# Patient Record
Sex: FEMALE | Race: WHITE | Employment: PART TIME | URBAN - METROPOLITAN AREA
[De-identification: names, ages, dates, MRNs, and addresses within clinical notes are randomized per-mention and may not be internally consistent; named-entity substitution may affect disease eponyms.]

---

## 2017-06-21 LAB — PAP SMEAR - HIM PATIENT REPORTED: NEGATIVE

## 2017-07-11 ENCOUNTER — OFFICE VISIT (OUTPATIENT)
Dept: FAMILY MEDICINE | Facility: CLINIC | Age: 32
End: 2017-07-11
Payer: COMMERCIAL

## 2017-07-11 VITALS
DIASTOLIC BLOOD PRESSURE: 70 MMHG | RESPIRATION RATE: 20 BRPM | OXYGEN SATURATION: 97 % | WEIGHT: 117.2 LBS | HEIGHT: 66 IN | SYSTOLIC BLOOD PRESSURE: 104 MMHG | HEART RATE: 73 BPM | BODY MASS INDEX: 18.84 KG/M2 | TEMPERATURE: 98 F

## 2017-07-11 DIAGNOSIS — Z30.09 BIRTH CONTROL COUNSELING: ICD-10-CM

## 2017-07-11 DIAGNOSIS — Z09 HOSPITAL DISCHARGE FOLLOW-UP: Primary | ICD-10-CM

## 2017-07-11 DIAGNOSIS — K35.30 ACUTE APPENDICITIS WITH LOCALIZED PERITONITIS: ICD-10-CM

## 2017-07-11 DIAGNOSIS — Z97.5: ICD-10-CM

## 2017-07-11 DIAGNOSIS — T83.9XXD: ICD-10-CM

## 2017-07-11 DIAGNOSIS — Z80.3 FAMILY HISTORY OF MALIGNANT NEOPLASM OF BREAST: ICD-10-CM

## 2017-07-11 PROBLEM — M54.50 LOW BACK PAIN: Status: ACTIVE | Noted: 2017-01-23

## 2017-07-11 PROBLEM — T83.9XXA: Status: ACTIVE | Noted: 2017-07-05

## 2017-07-11 PROBLEM — Z87.59 PERSONAL HISTORY OF OTHER COMPLICATIONS OF PREGNANCY, CHILDBIRTH AND THE PUERPERIUM: Status: ACTIVE | Noted: 2017-05-05

## 2017-07-11 PROCEDURE — 99204 OFFICE O/P NEW MOD 45 MIN: CPT | Performed by: PHYSICIAN ASSISTANT

## 2017-07-11 RX ORDER — IBUPROFEN 800 MG/1
800 TABLET, FILM COATED ORAL
COMMUNITY
Start: 2017-07-06 | End: 2017-08-18

## 2017-07-11 NOTE — Clinical Note
Mammogram: 9/2/15 negative, [facility], repeat yearly Pap: 6/21/17 negative, [facility], negative HPV
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
## 2017-07-11 NOTE — MR AVS SNAPSHOT
"              After Visit Summary   2017    Teena Fagan    MRN: [id number]           Patient Information     Date Of Birth          1985        Visit Information        Provider Department      2017 1:30 PM Anna Esteves PA-C [facility]        Today's [facility]     Hospital discharge follow-up    -  1       Follow-ups after your visit        Who to contact     If you have questions or need follow up information about today's clinic visit or your schedule please contact [facility] directly at [phone].  Normal or non-critical lab and imaging results will be communicated to you by [employer], letter or phone within 4 business days after the clinic has received the results. If you do not hear from us within 7 days, please contact the clinic through [employer] or phone. If you have a critical or abnormal lab result, we will notify you by phone as soon as possible.  Submit refill requests through [employer] or call your pharmacy and they will forward the refill request to us. Please allow 3 business days for your refill to be completed.          Additional Information About Your Visit        MyChart Information     [employer] lets you send messages to your doctor, view your test results, renew your prescriptions, schedule appointments and more. To sign up, go to [email or web address] . Click on \"Log in\" on the left side of the screen, which will take you to the Welcome page. Then click on \"Sign up Now\" on the right side of the page.     You will be asked to enter the access code listed below, as well as some personal information. Please follow the directions to create your username and password.     Your access code is: [email or web address]  Expires: 10/9/2017  2:03 PM     Your access code will  in 90 days. If you need help or a new code, please call your [facility] or [phone].        Care EveryWhere ID     This is your Care EveryWhere ID. This could be " "used by other organizations to access your [place] medical records  [id number]        Your Vitals Were     Pulse Temperature Respirations Height Pulse Oximetry Breastfeeding?    73 98  F (36.7  C) (Oral) 20 5' 5.5\" (1.664 m) 97% Yes    BMI (Body Mass Index)                   19.21 kg/m2            Blood Pressure from Last 3 Encounters:   07/11/17 104/70   12/17/14 105/77    Weight from Last 3 Encounters:   07/11/17 117 lb 3.2 oz (53.2 kg)              Today, you had the following     No orders found for display       Primary Care Provider    None Specified       No primary provider on file.        Equal Access to Services     [facility]: Hadii tahmina Palumbo, lilia larsen, chelsi mcknightalmaissa sapp, naldo eckert . So [facility] [phone].    ATENCIÓN: Si habla español, tiene a carbajal disposición servicios gratuitos de asistencia lingüística. Llame al [phone].    We comply with applicable federal civil rights laws and [place] laws. We do not discriminate on the basis of race, color, national origin, age, disability sex, sexual orientation or gender identity.            Thank you!     Thank you for choosing [facility]  for your care. Our goal is always to provide you with excellent care. Hearing back from our patients is one way we can continue to improve our services. Please take a few minutes to complete the written survey that you may receive in the mail after your visit with us. Thank you!             Your Updated Medication List - Protect others around you: Learn how to safely use, store and throw away your medicines at www.disposemymeds.org.      Notice  As of 7/11/2017  2:03 PM    You have not been prescribed any medications.      "

## 2017-08-18 ENCOUNTER — OFFICE VISIT (OUTPATIENT)
Dept: FAMILY MEDICINE | Facility: CLINIC | Age: 32
End: 2017-08-18
Payer: COMMERCIAL

## 2017-08-18 VITALS
WEIGHT: 117 LBS | SYSTOLIC BLOOD PRESSURE: 100 MMHG | HEART RATE: 87 BPM | OXYGEN SATURATION: 98 % | TEMPERATURE: 97.2 F | BODY MASS INDEX: 19.17 KG/M2 | DIASTOLIC BLOOD PRESSURE: 60 MMHG

## 2017-08-18 DIAGNOSIS — R00.2 PALPITATIONS: Primary | ICD-10-CM

## 2017-08-18 DIAGNOSIS — D17.1 BENIGN LIPOMATOUS NEOPLASM OF SKIN AND SUBCUTANEOUS TISSUE OF TRUNK: ICD-10-CM

## 2017-08-18 DIAGNOSIS — K64.4 EXTERNAL HEMORRHOIDS: ICD-10-CM

## 2017-08-18 LAB
ERYTHROCYTE [DISTWIDTH] IN BLOOD BY AUTOMATED COUNT: 11.4 % (ref 10–15)
HCT VFR BLD AUTO: 37.7 % (ref 35–47)
HGB BLD-MCNC: 12.4 G/DL (ref 11.7–15.7)
MCH RBC QN AUTO: 29.7 PG (ref 26.5–33)
MCHC RBC AUTO-ENTMCNC: 32.9 G/DL (ref 31.5–36.5)
MCV RBC AUTO: 90 FL (ref 78–100)
PLATELET # BLD AUTO: 279 10E9/L (ref 150–450)
RBC # BLD AUTO: 4.17 10E12/L (ref 3.8–5.2)
WBC # BLD AUTO: 6.6 10E9/L (ref 4–11)

## 2017-08-18 PROCEDURE — 85027 COMPLETE CBC AUTOMATED: CPT | Performed by: PHYSICIAN ASSISTANT

## 2017-08-18 PROCEDURE — 36415 COLL VENOUS BLD VENIPUNCTURE: CPT | Performed by: PHYSICIAN ASSISTANT

## 2017-08-18 PROCEDURE — 93000 ELECTROCARDIOGRAM COMPLETE: CPT | Performed by: PHYSICIAN ASSISTANT

## 2017-08-18 PROCEDURE — 84443 ASSAY THYROID STIM HORMONE: CPT | Performed by: PHYSICIAN ASSISTANT

## 2017-08-18 PROCEDURE — 99214 OFFICE O/P EST MOD 30 MIN: CPT | Performed by: PHYSICIAN ASSISTANT
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
## 2017-08-18 NOTE — NURSING NOTE
"Chief Complaint   Patient presents with     Rectal Problem       Initial /60 (BP Location: Right arm, Patient Position: Chair, Cuff Size: Adult Regular)  Pulse 87  Temp 97.2  F (36.2  C) (Tympanic)  Wt 117 lb (53.1 kg)  SpO2 98%  BMI 19.17 kg/m2 Estimated body mass index is 19.17 kg/(m^2) as calculated from the following:    Height as of 7/11/17: 5' 5.5\" (1.664 m).    Weight as of this encounter: 117 lb (53.1 kg).  Medication Reconciliation: complete.Tan BALDERAS MA      "

## 2017-08-19 LAB — TSH SERPL DL<=0.005 MIU/L-ACNC: 2.17 MU/L (ref 0.4–4)

## 2017-08-22 ENCOUNTER — HOSPITAL ENCOUNTER (OUTPATIENT)
Dept: CARDIOLOGY | Facility: CLINIC | Age: 32
Discharge: HOME | End: 2017-08-22
Attending: PHYSICIAN ASSISTANT | Admitting: PHYSICIAN ASSISTANT
Payer: COMMERCIAL

## 2017-08-22 DIAGNOSIS — R00.2 PALPITATIONS: ICD-10-CM

## 2017-08-22 PROCEDURE — 93272 ECG/REVIEW INTERPRET ONLY: CPT | Performed by: INTERNAL MEDICINE

## 2017-08-22 PROCEDURE — 93270 REMOTE 30 DAY ECG REV/REPORT: CPT

## 2017-10-03 ENCOUNTER — TRANSFERRED RECORDS (OUTPATIENT)
Dept: HEALTH INFORMATION MANAGEMENT | Facility: CLINIC | Age: 32
End: 2017-10-03

## 2017-10-13 ENCOUNTER — TRANSFERRED RECORDS (OUTPATIENT)
Dept: HEALTH INFORMATION MANAGEMENT | Facility: CLINIC | Age: 32
End: 2017-10-13

## 2017-10-17 ENCOUNTER — TRANSFERRED RECORDS (OUTPATIENT)
Dept: HEALTH INFORMATION MANAGEMENT | Facility: CLINIC | Age: 32
End: 2017-10-17

## 2017-12-01 ENCOUNTER — OFFICE VISIT (OUTPATIENT)
Dept: FAMILY MEDICINE | Facility: CLINIC | Age: 32
End: 2017-12-01
Payer: COMMERCIAL

## 2017-12-01 VITALS
WEIGHT: 115.4 LBS | DIASTOLIC BLOOD PRESSURE: 62 MMHG | BODY MASS INDEX: 18.54 KG/M2 | SYSTOLIC BLOOD PRESSURE: 108 MMHG | RESPIRATION RATE: 18 BRPM | HEART RATE: 91 BPM | HEIGHT: 66 IN | OXYGEN SATURATION: 94 % | TEMPERATURE: 97.8 F

## 2017-12-01 DIAGNOSIS — I47.10 PAROXYSMAL SUPRAVENTRICULAR TACHYCARDIA (H): Primary | ICD-10-CM

## 2017-12-01 PROBLEM — Z12.4 CERVICAL CANCER SCREENING: Status: ACTIVE | Noted: 2017-06-27

## 2017-12-01 PROCEDURE — 99213 OFFICE O/P EST LOW 20 MIN: CPT | Performed by: PHYSICIAN ASSISTANT

## 2017-12-01 RX ORDER — PETROLATUM,WHITE
OINTMENT IN PACKET (GRAM) TOPICAL
COMMUNITY
Start: 2017-10-20

## 2017-12-01 NOTE — NURSING NOTE
"Chief Complaint   Patient presents with     Pre-Op Exam       Initial /62 (BP Location: Right arm, Patient Position: Chair, Cuff Size: Adult Regular)  Pulse 91  Temp 97.8  F (36.6  C) (Oral)  Resp 18  Ht 5' 5.5\" (1.664 m)  Wt 115 lb 6.4 oz (52.3 kg)  LMP  (LMP Unknown)  SpO2 94%  Breastfeeding? No  BMI 18.91 kg/m2 Estimated body mass index is 18.91 kg/(m^2) as calculated from the following:    Height as of this encounter: 5' 5.5\" (1.664 m).    Weight as of this encounter: 115 lb 6.4 oz (52.3 kg).  Medication Reconciliation: complete   Madi Avalos CMA (AAMA)    "

## 2017-12-01 NOTE — MR AVS SNAPSHOT
After Visit Summary   12/1/2017    Teena Fagan    MRN: [id number]           Patient Information     Date Of Birth          1985        Visit Information        Provider Department      12/1/2017 10:30 AM Anna Esteves PA-C [facility]        Today's Diagnoses     Paroxysmal supraventricular tachycardia (H)    -  1      Care Instructions      Before Your Surgery      Call your surgeon if there is any change in your health. This includes signs of a cold or flu (such as a sore throat, runny nose, cough, rash or fever).    Do not smoke, drink alcohol or take over the counter medicine (unless your surgeon or primary care doctor tells you to) for the 24 hours before and after surgery.    If you take prescribed drugs: Follow your doctor s orders about which medicines to take and which to stop until after surgery.    Eating and drinking prior to surgery: follow the instructions from your surgeon    Take a shower or bath the night before surgery. Use the soap your surgeon gave you to gently clean your skin. If you do not have soap from your surgeon, use your regular soap. Do not shave or scrub the surgery site.  Wear clean pajamas and have clean sheets on your bed.           Follow-ups after your visit        Additional Services     CARDIOLOGY EVAL ADULT REFERRAL       Your provider has referred you to:  [facility]: [facility] ([phone]) [email or web address]    Please be aware that coverage of these services is subject to the terms and limitations of your health insurance plan.  Call member services at your health plan with any benefit or coverage questions.      Type of Referral:  New Cardiology Consult    Timeframe requested:  1-3 Days    Please bring the following to your appointment:  >>   Any x-rays, CTs or MRIs which have been performed.  Contact the facility where they were done to arrange for  prior  "to your scheduled appointment.    >>   List of current medications  >>   This referral request   >>   Any documents/labs given to you for this referral                  Who to contact     If you have questions or need follow up information about today's clinic visit or your schedule please contact [facility] directly at [phone].  Normal or non-critical lab and imaging results will be communicated to you by MyChart, letter or phone within 4 business days after the clinic has received the results. If you do not hear from us within 7 days, please contact the clinic through [employer] or phone. If you have a critical or abnormal lab result, we will notify you by phone as soon as possible.  Submit refill requests through [employer] or call your pharmacy and they will forward the refill request to us. Please allow 3 business days for your refill to be completed.          Additional Information About Your Visit        MyChart Information     [employer] lets you send messages to your doctor, view your test results, renew your prescriptions, schedule appointments and more. To sign up, go to [email or web address] . Click on \"Log in\" on the left side of the screen, which will take you to the Welcome page. Then click on \"Sign up Now\" on the right side of the page.     You will be asked to enter the access code listed below, as well as some personal information. Please follow the directions to create your username and password.     Your access code is: [email or web address]  Expires: 3/1/2018 11:13 AM     Your access code will  in 90 days. If you need help or a new code, please call your [place] clinic or [phone].        Care EveryWhere ID     This is your Care EveryWhere ID. This could be used by other organizations to access your [place] medical records  [id number]        Your Vitals Were     Pulse Temperature Respirations Height Last Period Pulse Oximetry    91 97.8  F (36.6  C) (Oral) 18 5' 5.5\" (1.664 m) " (LMP Unknown) 94%    Breastfeeding? BMI (Body Mass Index)                No 18.91 kg/m2           Blood Pressure from Last 3 Encounters:   12/01/17 108/62   08/18/17 100/60   07/11/17 104/70    Weight from Last 3 Encounters:   12/01/17 115 lb 6.4 oz (52.3 kg)   08/18/17 117 lb (53.1 kg)   07/11/17 117 lb 3.2 oz (53.2 kg)              We Performed the Following     CARDIOLOGY EVAL ADULT REFERRAL        Primary Care Provider Office Phone # Fax #    Anna Esteves PA-C [phone]       [address] [facility] [address]        Equal Access to Services     YONAS WALLIS : Hadii aad ku hadasho Sovelasquezali, waaxda luqadaha, qaybta kaalmada adeegyada, naldo eckert . So [facility] [phone].    ATENCIÓN: Si habla español, tiene a carbajal disposición servicios gratuitos de asistencia lingüística. Llame al [phone].    We comply with applicable federal civil rights laws and [place] laws. We do not discriminate on the basis of race, color, national origin, age, disability, sex, sexual orientation, or gender identity.            Thank you!     Thank you for choosing [facility]  for your care. Our goal is always to provide you with excellent care. Hearing back from our patients is one way we can continue to improve our services. Please take a few minutes to complete the written survey that you may receive in the mail after your visit with us. Thank you!             Your Updated Medication List - Protect others around you: Learn how to safely use, store and throw away your medicines at www.disposemymeds.org.          This list is accurate as of: 12/1/17 11:13 AM.  Always use your most recent med list.                   Brand Name Dispense Instructions for use Diagnosis    hydrocortisone 2.5 % cream    ANUSOL-HC     INSERT RECTALLY TWO TIMES A DAY        White Petrolatum Oint
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
## 2017-12-05 ENCOUNTER — OFFICE VISIT (OUTPATIENT)
Dept: CARDIOLOGY | Facility: CLINIC | Age: 32
End: 2017-12-05
Attending: PHYSICIAN ASSISTANT
Payer: COMMERCIAL

## 2017-12-05 VITALS
HEIGHT: 66 IN | HEART RATE: 72 BPM | SYSTOLIC BLOOD PRESSURE: 102 MMHG | BODY MASS INDEX: 18.98 KG/M2 | WEIGHT: 118.1 LBS | DIASTOLIC BLOOD PRESSURE: 60 MMHG

## 2017-12-05 DIAGNOSIS — I47.10 PAROXYSMAL SUPRAVENTRICULAR TACHYCARDIA (H): Primary | ICD-10-CM

## 2017-12-05 PROCEDURE — 93000 ELECTROCARDIOGRAM COMPLETE: CPT | Performed by: INTERNAL MEDICINE

## 2017-12-05 PROCEDURE — 99204 OFFICE O/P NEW MOD 45 MIN: CPT | Performed by: INTERNAL MEDICINE

## 2017-12-05 NOTE — LETTER
12/5/2017    Anna Esteves PA-C  [address] [place] [address]    RE: Teena Fagan       Dear Colleague,    I had the pleasure of seeing Teena Fagan in the [facility].    CARDIOLOGY CONSULT    REASON FOR CONSULT: SVT    PRIMARY CARE PHYSICIAN:  Anna Esteves    HISTORY OF PRESENT ILLNESS:  31-year-old female with no cardiac history is seen for palpitations.  She has no cardiac risk factors.  Of note her sister had aortic valve replacement at age 27.    Patient reports palpitations dating back many years.  About once per week she will feel her heart racing for about 5 seconds.  She never has any sustained episodes of racing heart.  There is no associated lightheadedness, dizziness, chest pain, or syncope.  She denies excessive caffeine use.  She recently had her first child and her pregnancy was uncomplicated.    She now is scheduled for surgery next week to have hemorrhoids and possible anal fissure repair.    Otherwise patient feels well.  She has no history of hypertension.  She does regular activity with no exertional symptoms.  She is moving to [place] in late December for her 's job.    30 day event monitor from August 2017 showed baseline rhythm of sinus, there were 2 episodes of SVT up to 17 seconds, she reported racing heart, she also had symptoms of palpitations a few times during sinus rhythm.    PAST MEDICAL HISTORY:  Past Medical History:   Diagnosis Date     Family history of breast cancer      Neuralgic migraine        MEDICATIONS:  Current Outpatient Prescriptions   Medication     hydrocortisone (ANUSOL-HC) 2.5 % cream     White Petrolatum OINT     No current facility-administered medications for this visit.        ALLERGIES:  Allergies   Allergen Reactions     Latex      Gluten Meal Rash     No Clinical Screening - See Comments Rash     Rubber, hospital soap       SOCIAL HISTORY:  I have reviewed this patient's social history  "and updated it with pertinent information if needed. Teena Fagan  reports that she has never smoked. She has never used smokeless tobacco. She reports that she does not drink alcohol.    FAMILY HISTORY:  I have reviewed this patient's family history and updated it with pertinent information if needed.   Family History   Problem Relation Age of Onset     Breast Cancer Mother 30     Breast Cancer Maternal Grandmother 50     Ovarian Cancer Maternal Grandmother      Ovarian Cancer Other      Breast Cancer Maternal Aunt 30     BRCA     CANCER Maternal Aunt 48     fallopian tube     Breast Cancer Maternal Aunt 40     bilateral     Breast Cancer Maternal Aunt 50     Breast Cancer Maternal Aunt      great aunt     Melanoma No family hx of        REVIEW OF SYSTEMS:  Constitutional:  No weight loss, fever, chills, weakness or fatigue.  HEENT:  Eyes:  No visual loss, blurred vision, double vision or yellow sclerae. No hearing loss, sneezing, congestion, runny nose or sore throat.  Skin:  No rash or itching.  Cardiovascular: per HPI  Respiratory: per HPI  GI:  No anorexia, nausea, vomiting or diarrhea. No abdominal pain or blood.  :  No dysurea, hematuria  Neurologic:  No headache, dizziness, syncope, paralysis, ataxia, numbness or tingling in the extremities. No change in bowel or bladder control.  Musculoskeletal:  No muscle, back pain, joint pain or stiffness.  Hematologic:  No anemia, bleeding or bruising.  Lymphatics:  No enlarged nodes. No history of splenectomy.  Psychiatric:  No history of depression or anxiety.  Endocrine:  No reports of sweating, cold or heat intolerance. No polyuria or polydipsia.  Allergies:  No history of asthma, hives, eczema or rhinitis.    PHYSICAL EXAM:  /60 (BP Location: Right arm, Patient Position: Sitting, Cuff Size: Adult Regular)  Pulse 72  Ht 1.664 m (5' 5.5\")  Wt 53.6 kg (118 lb 1.6 oz)  LMP  (LMP Unknown)  Breastfeeding? No  BMI 19.35 kg/m2    Constitutional: awake, " alert, no distress  Eyes: PERRL, sclera nonicteric  ENT: trachea midline  Respiratory: Lungs clear  Cardiovascular: Regular rate and rhythm, no murmurs  GI: nondistended, nontender, bowel sounds present  Lymph/Hematologic: no lymphadenopathy  Skin: dry, no rash  Musculoskeletal: good muscle tone, strength 5/5 in upper and lower extremities  Neurologic: no focal deficits  Neuropsychiatric: appropriate affact    DATA:  EKG: December 5, 2017: Sinus rhythm, rate 81, normal axis and intervals, very minor nonspecific ST change in lead 3, no preexcitation    ASSESSMENT:  31-year-old female seen for paroxysmal SVT.  Her symptoms are quite classic.  It is unsure how much of her symptoms are from the SVT, as she had symptoms during sinus rhythm on event monitor but also during SVT.  It was explained that this is a benign arrhythmia.  If her symptoms became frequent or bothersome, trial of metoprolol or diltiazem would be reasonable.  Also because of her young age if she wanted to avoid medication, referral to EP to discuss ablation would be reasonable.    This is not very bothersome to the patient, therefore she does not want to pursue any treatment at this time.  Echocardiogram was recommended to rule out any underlying structural heart disease.  Of note her half-sister probably had a bicuspid aortic valve and echo would be indicated anyway for screening purposes.    RECOMMENDATIONS:  1.  Paroxysmal SVT  - No treatment at this time as symptoms do not bother the patient  - If symptoms increase in the future, consider trial of medication or refer to EP to discuss ablation  - Echocardiogram recommended    2.  Probable bicuspid aortic valve in her half-sister  - Echocardiogram recommended    3.  Preop cardiovascular evaluation  - Patient has no high risk cardiac symptoms or features.  She is very low risk for any cardiac complications for this upcoming intermediate risk procedure.    Patient is moving to [place] in a few weeks.   She will look into having an echo when she is established down there.    Follow up as needed.    Giancarlo Gama MD  Cardiology - [facility]  Pager:  [phone]  Text Page  December 5, 2017    Thank you for allowing me to participate in the care of your patient.    Sincerely,     Giancarlo Gama MD     [facility]

## 2017-12-05 NOTE — MR AVS SNAPSHOT
After Visit Summary   12/5/2017    Teena Fagan    MRN: [id number]           Patient Information     Date Of Birth          1985        Visit Information        Provider Department      12/5/2017 1:15 PM Giancarlo Gama MD [facility]        Today's Diagnoses     Paroxysmal supraventricular tachycardia (H)    -  1      Care Instructions    1. You are having short episodes of paroxysmal SVT.  This means the upper heart chambers are beating too fast for short periods of time.  This is nothing dangerous or life-threatening, but can be a nuisance.  Treatment options include doing nothing if your symptoms are minimal, trying a medication which can suppress these extra beats, or considering a procedure called an ablation.            Follow-ups after your visit        Your next 10 appointments already scheduled     Dec 11, 2017  7:50 AM CST   Pre-Op physical with Anna Esteves PA-C   [facility]    [address] [address] [phone]              Who to contact     If you have questions or need follow up information about today's clinic visit or your schedule please contact [facility] directly at [phone].  Normal or non-critical lab and imaging results will be communicated to you by MyChart, letter or phone within 4 business days after the clinic has received the results. If you do not hear from us within 7 days, please contact the clinic through MyChart or phone. If you have a critical or abnormal lab result, we will notify you by phone as soon as possible.  Submit refill requests through PowerFile or call your pharmacy and they will forward the refill request to us. Please allow 3 business days for your refill to be completed.          Additional Information About Your Visit        MyChart Information     PowerFile lets  "you send messages to your doctor, view your test results, renew your prescriptions, schedule appointments and more. To sign up, go to [email or web address] . Click on \"Log in\" on the left side of the screen, which will take you to the Welcome page. Then click on \"Sign up Now\" on the right side of the page.     You will be asked to enter the access code listed below, as well as some personal information. Please follow the directions to create your username and password.     Your access code is: [email or web address]  Expires: 3/1/2018 11:13 AM     Your access code will  in 90 days. If you need help or a new code, please call your [facility] clinic or [phone].        Care EveryWhere ID     This is your Care EveryWhere ID. This could be used by other organizations to access your [facility] medical records  [id number]        Your Vitals Were     Pulse Height Last Period Breastfeeding? BMI (Body Mass Index)       72 1.664 m (5' 5.5\") (LMP Unknown) No 19.35 kg/m2        Blood Pressure from Last 3 Encounters:   17 102/60   17 108/62   17 100/60    Weight from Last 3 Encounters:   17 53.6 kg (118 lb 1.6 oz)   17 52.3 kg (115 lb 6.4 oz)   17 53.1 kg (117 lb)              We Performed the Following     EKG 12-lead complete w/read - Clinics (performed today)        Primary Care Provider Office Phone # Fax #    Anna Esteves PA-C [phone] [address] [facility] [address]        Equal Access to Services     [facility] Center[facility] : Hadgreg Palumbo, wamalorieda chava, qaaba kaalmada sekou, naldo buenrostro. So [facility] [phone].    ATENCIÓN: Si habla español, tiene a carbajal disposición servicios gratuitos de asistencia lingüística. Llame al [phone].    We comply with applicable federal civil rights laws and [place] laws. We do not discriminate on the basis of race, color, national origin, age, disability, sex, sexual " orientation, or gender identity.            Thank you!     Thank you for choosing [facility]  for your care. Our goal is always to provide you with excellent care. Hearing back from our patients is one way we can continue to improve our services. Please take a few minutes to complete the written survey that you may receive in the mail after your visit with us. Thank you!             Your Updated Medication List - Protect others around you: Learn how to safely use, store and throw away your medicines at www.disposemymeds.org.          This list is accurate as of: 12/5/17  1:57 PM.  Always use your most recent med list.                   Brand Name Dispense Instructions for use Diagnosis    hydrocortisone 2.5 % cream    ANUSOL-HC     INSERT RECTALLY TWO TIMES A DAY        White Petrolatum Oint

## 2017-12-05 NOTE — PROGRESS NOTES
CARDIOLOGY CONSULT    REASON FOR CONSULT: SVT    PRIMARY CARE PHYSICIAN:  Anna Esteves    HISTORY OF PRESENT ILLNESS:  31-year-old female with no cardiac history is seen for palpitations.  She has no cardiac risk factors.  Of note her sister had aortic valve replacement at age 27.    Patient reports palpitations dating back many years.  About once per week she will feel her heart racing for about 5 seconds.  She never has any sustained episodes of racing heart.  There is no associated lightheadedness, dizziness, chest pain, or syncope.  She denies excessive caffeine use.  She recently had her first child and her pregnancy was uncomplicated.    She now is scheduled for surgery next week to have hemorrhoids and possible anal fissure repair.    Otherwise patient feels well.  She has no history of hypertension.  She does regular activity with no exertional symptoms.  She is moving to [place] in late December for her 's job.    30 day event monitor from August 2017 showed baseline rhythm of sinus, there were 2 episodes of SVT up to 17 seconds, she reported racing heart, she also had symptoms of palpitations a few times during sinus rhythm.    PAST MEDICAL HISTORY:  Past Medical History:   Diagnosis Date     Family history of breast cancer      Neuralgic migraine        MEDICATIONS:  Current Outpatient Prescriptions   Medication     hydrocortisone (ANUSOL-HC) 2.5 % cream     White Petrolatum OINT     No current facility-administered medications for this visit.        ALLERGIES:  Allergies   Allergen Reactions     Latex      Gluten Meal Rash     No Clinical Screening - See Comments Rash     Rubber, hospital soap       SOCIAL HISTORY:  I have reviewed this patient's social history and updated it with pertinent information if needed. Teena SEBASTIAN Fagan  reports that she has never smoked. She has never used smokeless tobacco. She reports that she does not drink alcohol.    FAMILY HISTORY:  I have reviewed this  "patient's family history and updated it with pertinent information if needed.   Family History   Problem Relation Age of Onset     Breast Cancer Mother 30     Breast Cancer Maternal Grandmother 50     Ovarian Cancer Maternal Grandmother      Ovarian Cancer Other      Breast Cancer Maternal Aunt 30     BRCA     CANCER Maternal Aunt 48     fallopian tube     Breast Cancer Maternal Aunt 40     bilateral     Breast Cancer Maternal Aunt 50     Breast Cancer Maternal Aunt      great aunt     Melanoma No family hx of        REVIEW OF SYSTEMS:  Constitutional:  No weight loss, fever, chills, weakness or fatigue.  HEENT:  Eyes:  No visual loss, blurred vision, double vision or yellow sclerae. No hearing loss, sneezing, congestion, runny nose or sore throat.  Skin:  No rash or itching.  Cardiovascular: per HPI  Respiratory: per HPI  GI:  No anorexia, nausea, vomiting or diarrhea. No abdominal pain or blood.  :  No dysurea, hematuria  Neurologic:  No headache, dizziness, syncope, paralysis, ataxia, numbness or tingling in the extremities. No change in bowel or bladder control.  Musculoskeletal:  No muscle, back pain, joint pain or stiffness.  Hematologic:  No anemia, bleeding or bruising.  Lymphatics:  No enlarged nodes. No history of splenectomy.  Psychiatric:  No history of depression or anxiety.  Endocrine:  No reports of sweating, cold or heat intolerance. No polyuria or polydipsia.  Allergies:  No history of asthma, hives, eczema or rhinitis.    PHYSICAL EXAM:  /60 (BP Location: Right arm, Patient Position: Sitting, Cuff Size: Adult Regular)  Pulse 72  Ht 1.664 m (5' 5.5\")  Wt 53.6 kg (118 lb 1.6 oz)  LMP  (LMP Unknown)  Breastfeeding? No  BMI 19.35 kg/m2    Constitutional: awake, alert, no distress  Eyes: PERRL, sclera nonicteric  ENT: trachea midline  Respiratory: Lungs clear  Cardiovascular: Regular rate and rhythm, no murmurs  GI: nondistended, nontender, bowel sounds present  Lymph/Hematologic: no " lymphadenopathy  Skin: dry, no rash  Musculoskeletal: good muscle tone, strength 5/5 in upper and lower extremities  Neurologic: no focal deficits  Neuropsychiatric: appropriate affact    DATA:  EKG: December 5, 2017: Sinus rhythm, rate 81, normal axis and intervals, very minor nonspecific ST change in lead 3, no preexcitation    ASSESSMENT:  31-year-old female seen for paroxysmal SVT.  Her symptoms are quite classic.  It is unsure how much of her symptoms are from the SVT, as she had symptoms during sinus rhythm on event monitor but also during SVT.  It was explained that this is a benign arrhythmia.  If her symptoms became frequent or bothersome, trial of metoprolol or diltiazem would be reasonable.  Also because of her young age if she wanted to avoid medication, referral to EP to discuss ablation would be reasonable.    This is not very bothersome to the patient, therefore she does not want to pursue any treatment at this time.  Echocardiogram was recommended to rule out any underlying structural heart disease.  Of note her half-sister probably had a bicuspid aortic valve and echo would be indicated anyway for screening purposes.    RECOMMENDATIONS:  1.  Paroxysmal SVT  - No treatment at this time as symptoms do not bother the patient  - If symptoms increase in the future, consider trial of medication or refer to EP to discuss ablation  - Echocardiogram recommended    2.  Probable bicuspid aortic valve in her half-sister  - Echocardiogram recommended    3.  Preop cardiovascular evaluation  - Patient has no high risk cardiac symptoms or features.  She is very low risk for any cardiac complications for this upcoming intermediate risk procedure.    Patient is moving to [place] in a few weeks.  She will look into having an echo when she is established down there.    Follow up as needed.    Giancarlo Gama MD  Cardiology - [facility]  Pager:  [phone]  Text Page  December 5, 2017

## 2017-12-07 ENCOUNTER — TRANSFERRED RECORDS (OUTPATIENT)
Dept: HEALTH INFORMATION MANAGEMENT | Facility: CLINIC | Age: 32
End: 2017-12-07

## 2017-12-24 ENCOUNTER — HEALTH MAINTENANCE LETTER (OUTPATIENT)
Age: 32
End: 2017-12-24

## 2020-03-23 NOTE — PATIENT INSTRUCTIONS
1. You are having short episodes of paroxysmal SVT.  This means the upper heart chambers are beating too fast for short periods of time.  This is nothing dangerous or life-threatening, but can be a nuisance.  Treatment options include doing nothing if your symptoms are minimal, trying a medication which can suppress these extra beats, or considering a procedure called an ablation.     New prescription sent
